# Patient Record
Sex: MALE | Race: WHITE
[De-identification: names, ages, dates, MRNs, and addresses within clinical notes are randomized per-mention and may not be internally consistent; named-entity substitution may affect disease eponyms.]

---

## 2019-10-28 ENCOUNTER — HOSPITAL ENCOUNTER (EMERGENCY)
Dept: HOSPITAL 33 - ED | Age: 54
Discharge: HOME | End: 2019-10-28
Payer: COMMERCIAL

## 2019-10-28 VITALS — DIASTOLIC BLOOD PRESSURE: 80 MMHG | SYSTOLIC BLOOD PRESSURE: 123 MMHG | HEART RATE: 68 BPM

## 2019-10-28 VITALS — OXYGEN SATURATION: 97 %

## 2019-10-28 DIAGNOSIS — R03.0: ICD-10-CM

## 2019-10-28 DIAGNOSIS — H52.13: ICD-10-CM

## 2019-10-28 DIAGNOSIS — H53.8: Primary | ICD-10-CM

## 2019-10-28 LAB
ALBUMIN SERPL-MCNC: 4.1 G/DL (ref 3.5–5)
ALP SERPL-CCNC: 63 U/L (ref 38–126)
ALT SERPL-CCNC: 25 U/L (ref 0–50)
AMPHETAMINES UR QL: NEGATIVE
ANION GAP SERPL CALC-SCNC: 13.8 MEQ/L (ref 5–15)
APTT PPP: 30.5 SECONDS (ref 24.1–36.1)
AST SERPL QL: 20 U/L (ref 17–59)
BARBITURATES UR QL: NEGATIVE
BASOPHILS # BLD AUTO: 0.01 10*3/UL (ref 0–0.4)
BASOPHILS NFR BLD AUTO: 0.1 % (ref 0–0.4)
BENZODIAZ UR QL SCN: NEGATIVE
BILIRUB BLD-MCNC: 0.4 MG/DL (ref 0.2–1.3)
BUN SERPL-MCNC: 25 MG/DL (ref 9–20)
CALCIUM SPEC-MCNC: 9.4 MG/DL (ref 8.4–10.2)
CHLORIDE SERPL-SCNC: 108 MMOL/L (ref 98–107)
CO2 SERPL-SCNC: 27 MMOL/L (ref 22–30)
COCAINE UR QL SCN: NEGATIVE
CREAT SERPL-MCNC: 1.09 MG/DL (ref 0.66–1.25)
EOSINOPHIL # BLD AUTO: 0.02 10*3/UL (ref 0–0.5)
ETHANOL SERPL-MCNC: < 10 MG/DL (ref 0–10)
GLUCOSE SERPL-MCNC: 97 MG/DL (ref 74–106)
GLUCOSE UR-MCNC: NEGATIVE MG/DL
HCT VFR BLD AUTO: 43.5 % (ref 42–50)
HGB BLD-MCNC: 14.6 GM/DL (ref 12.5–18)
INR PPP: 1.09 (ref 0.8–3)
LYMPHOCYTES # SPEC AUTO: 1.81 10*3/UL (ref 1–4.6)
MCH RBC QN AUTO: 31.6 PG (ref 26–32)
MCHC RBC AUTO-ENTMCNC: 33.6 G/DL (ref 32–36)
METHADONE UR QL: NEGATIVE
MONOCYTES # BLD AUTO: 0.46 10*3/UL (ref 0–1.3)
OPIATES UR QL: NEGATIVE
PCP UR QL CFM>20 NG/ML: NEGATIVE
PLATELET # BLD AUTO: 223 K/MM3 (ref 150–450)
POTASSIUM SERPLBLD-SCNC: 4.2 MMOL/L (ref 3.5–5.1)
PROT SERPL-MCNC: 6.9 G/DL (ref 6.3–8.2)
PROT UR STRIP-MCNC: NEGATIVE MG/DL
PROTHROMBIN TIME: 12.3 SECONDS (ref 8.83–12.87)
RBC # BLD AUTO: 4.62 M/MM3 (ref 4.1–5.6)
SODIUM SERPL-SCNC: 144 MMOL/L (ref 137–145)
THC UR QL SCN: NEGATIVE
WBC # BLD AUTO: 6.9 K/MM3 (ref 4–10.5)
WBC #/AREA URNS HPF: (no result) /HPF (ref 0–5)

## 2019-10-28 PROCEDURE — 85025 COMPLETE CBC W/AUTO DIFF WBC: CPT

## 2019-10-28 PROCEDURE — 85730 THROMBOPLASTIN TIME PARTIAL: CPT

## 2019-10-28 PROCEDURE — 99284 EMERGENCY DEPT VISIT MOD MDM: CPT

## 2019-10-28 PROCEDURE — 36415 COLL VENOUS BLD VENIPUNCTURE: CPT

## 2019-10-28 PROCEDURE — 80053 COMPREHEN METABOLIC PANEL: CPT

## 2019-10-28 PROCEDURE — 93005 ELECTROCARDIOGRAM TRACING: CPT

## 2019-10-28 PROCEDURE — 94760 N-INVAS EAR/PLS OXIMETRY 1: CPT

## 2019-10-28 PROCEDURE — 70450 CT HEAD/BRAIN W/O DYE: CPT

## 2019-10-28 PROCEDURE — 80307 DRUG TEST PRSMV CHEM ANLYZR: CPT

## 2019-10-28 PROCEDURE — 81001 URINALYSIS AUTO W/SCOPE: CPT

## 2019-10-28 PROCEDURE — G0480 DRUG TEST DEF 1-7 CLASSES: HCPCS

## 2019-10-28 PROCEDURE — 85610 PROTHROMBIN TIME: CPT

## 2019-10-28 PROCEDURE — 36000 PLACE NEEDLE IN VEIN: CPT

## 2019-10-28 PROCEDURE — 93041 RHYTHM ECG TRACING: CPT

## 2019-10-28 PROCEDURE — 84484 ASSAY OF TROPONIN QUANT: CPT

## 2019-10-28 NOTE — XRAY
Indication: Blurry vision.



Multiple contiguous axial images obtained through the head without contrast.



Comparison: None



Normal appearing brain parenchyma, ventricles, and bony calvarium.  Visualized

paranasal sinuses and mastoid air cells are clear.



Impression: Normal CT head without contrast exam.



CTDI 68.81

## 2019-10-28 NOTE — ERPHSYRPT
- History of Present Illness


Time Seen by Provider: 10/28/19 16:50


Source: patient


Exam Limitations: no limitations


Patient Subjective Stated Complaint: pt here for blurred vision to both eyes 

started at 1535 today, worse in rgiht eye, he states it is hazy, denies any 

injury


Triage Nursing Assessment: pt alert, walked in, resp easy, skin w/d/p. moves 

all ext well,  strong, pupils equal and reactive


Physician History: 





Patient began having blurry vision bilaterally prior to coming into the 

emergency department while driving into work.  Patient denies any loss of 

visual fields or any recent trauma to his eyes.


Timing/Duration: today, hour(s) (1)


Location: bilateral eyes


Severity: moderate


Apparent Injury: no


Associated Symptoms: blurred vision, No pain, No burning, No itching, No 

sensitivity to light, No redness, No matting, No eyelid swelling, No foreign 

body sensation, No decreased vision, No double vision


Visual Assistive Devices: None


Chemical Exposure: No


Trauma: No


[place]/Tanning Bed Exposure: No


Allergies/Adverse Reactions: 








No Known Drug Allergies Allergy (Unverified 10/28/19 16:50)


 





Home Medications: 








No Reportable Medications [No Reported Medications]  10/28/19 [History]





Hx Tetanus, Diphtheria Vaccination/Date Given: No


Hx Influenza Vaccination/Date Given: No


Hx Pneumococcal Vaccination/Date Given: No


Immunizations Up to Date: Yes





- Review of Systems


Constitutional: No Fever, No Chills, No Fatigue, No Malaise


Eyes: Vision Changes, No Discharge, No Eye Pain, No Eye Redness, No Itchy, No 

Photophobia, No Tearing, No Double Vision, No Foreign Body Sensation


Ears, Nose, & Throat: No Ear Pain, No Nose Congestion, No Throat Pain, No Hoarse

, No Painful Swallowing


Respiratory: No Cough, No Dyspnea


Cardiac: No Chest Pain, No Palpitations


Abdominal/Gastrointestinal: No Abdominal Pain, No Nausea, No Vomiting


Genitourinary Symptoms: No Hematuria, No Flank Pain


Musculoskeletal: No Back Pain, No Neck Pain, No Joint Pain


Skin: No Pruritis, No Rash


Neurological: No Dizziness, No Focal Weakness, No Gait Changes, No Headache, No 

Lethargy, No Parasthesia, No Sensory Changes, No Tremors, No Vertigo


Psychological: No Anxiety


Endocrine: No Polydipsia, No Excessive Sweating


Hematologic/Lymphatic: No Easy Bleeding, No Easy Bruising


All Other Systems: Reviewed and Negative





- Past Medical History


Pertinent Past Medical History: No





- Past Surgical History


Past Surgical History: Yes


Musculoskeletal: Orthopedic Surgery


Other Surgical History: kidney stone removal





- Social History


Smoking Status: Never smoker


Exposure to second hand smoke: No


Drug Use: none


Patient Lives Alone: No





- Nursing Vital Signs


Nursing Vital Signs: 


 Initial Vital Signs











Temperature  98.2 F   10/28/19 16:44


 


Pulse Rate  74   10/28/19 16:44


 


Respiratory Rate  16   10/28/19 16:44


 


Blood Pressure  146/84   10/28/19 16:44


 


O2 Sat by Pulse Oximetry  97   10/28/19 16:44








 Pain Scale











Pain Intensity                 0

















- Physical Exam


General Appearance: no apparent distress, alert


Vision Acuity Degree Evaluation Phase: Uncorrected


Vision Acuity Right Eye: no loss of visual acuity in any visual field


Vision Acuity Left Eye: no loss of visual acuity in any visual field


Eye Exam: bilateral eye: normal inspection, PERRL, EOMI (negative papilledema, 

negative hemorrhages, normal appearing vasculature with no abnormalities 

appreciated to fundoscopic examinations bilaterally)


Ears, Nose, Throat Exam: normal ENT inspection, TMs normal, pharynx normal, 

moist mucous membranes, No TM abnormal (R), No TM abnormal (L), No pharyngeal 

erythema


Neck Exam: normal inspection, non-tender, supple, full range of motion, No 

meningismus, No Brudzinski, No Kernig's, No lymphadenopathy, No midline 

tenderness


Respiratory Exam: normal breath sounds, lungs clear, airway intact, No chest 

tenderness, No respiratory distress, No diminished breath sounds, No accessory 

muscle use, No crackles/rales, No rhonchi, No wheezing, No stridor, No pleural 

rub


Cardiovascular Exam: regular rate/rhythm, normal heart sounds, normal 

peripheral pulses, capillary refill <2 sec


Gastrointestinal Exam: soft, normal bowel sounds, No tenderness, No distention, 

No mass, No guarding, No pulsatile mass, No rebound


Extremity Exam: normal inspection, normal range of motion, pelvis stable, No 

calf tenderness, No desi's sign, No swelling


Neurologic: alert, oriented x 3, cooperative, CNs II-XII nml as tested, normal 

mood/affect, nml cerebellar function, sensation nml, other (NIHSS:  0), No 

uncooperative, No motor weakness


Skin Exam: normal color, warm, dry, No rash


**SpO2 Interpretation**: normal


SpO2: 97


O2 Delivery: Room Air





- Course


Nursing assessment & vital signs reviewed: Yes


EKG Interpreted by Me: RATE (73), Sinus Rhythm, NORMAL AXIS, NORMAL INTERVALS, 

NORMAL QRS, NORMAL ST-T, Other (no previous EKG for comparison)





- CT Exams


  ** Head


CT Interpretation: Negative, No/Intracranial Hemorrhag, Other (per Radiologist 

for interpretation:  Normal CT head without contrast exam; normal appearing 

brain parenchyma, ventricles, and bony calvarium.  Visualized paranasal sinuses 

and mastoid air cells are clear)


Ordered Tests: 


 Active Orders 24 hr











 Category Date Time Status


 


 Cardiac Monitor STAT Care  10/28/19 17:01 Active


 


 EKG-ER Only STAT Care  10/28/19 17:01 Active


 


 IV Insertion STAT Care  10/28/19 17:01 Active


 


 NPO (ED) STAT Care  10/28/19 17:01 Active


 


 Pulse Oximetry (ED) STAT Care  10/28/19 17:01 Active


 


 Visual Acuity STAT Care  10/28/19 16:51 Active


 


 HEAD WITHOUT CONTRAST [CT] Stat Exams  10/28/19 17:01 Completed


 


 CBC W DIFF Stat Lab  10/28/19 17:40 Completed


 


 CMP Stat Lab  10/28/19 17:40 Completed


 


 ETHYL ALCOHOL Stat Lab  10/28/19 17:40 Completed


 


 PROTIME WITH INR Stat Lab  10/28/19 17:40 Completed


 


 PTT Stat Lab  10/28/19 17:40 Completed


 


 TROPONIN Q3H Lab  10/28/19 17:40 Completed


 


 TROPONIN Q3H Lab  10/28/19 20:15 Ordered


 


 TROPONIN Q3H Lab  10/28/19 23:15 Ordered


 


 TROPONIN Q3H Lab  10/29/19 02:15 Ordered


 


 TROPONIN Q3H Lab  10/29/19 05:15 Ordered


 


 UA W/RFX UR CULTURE Stat Lab  10/28/19 19:00 Completed


 


 Urine Triage Profile Stat Lab  10/28/19 Completed











Lab/Rad Data: 


 Laboratory Result Diagrams





 10/28/19 17:40 





 10/28/19 17:40 





 Laboratory Results











  10/28/19 10/28/19 10/28/19 Range/Units





  Unknown 19:00 17:40 


 


WBC     (4.0-10.5)  K/mm3


 


RBC     (4.1-5.6)  M/mm3


 


Hgb     (12.5-18.0)  gm/dl


 


Hct     (42-50)  %


 


MCV     ()  fl


 


MCH     (26-32)  pg


 


MCHC     (32-36)  g/dl


 


RDW     (11.5-14.0)  %


 


Plt Count     (150-450)  K/mm3


 


MPV     (6-9.5)  fl


 


Gran %     (36.0-66.0)  %


 


Eos # (Auto)     (0-0.5)  


 


Absolute Lymphs (auto)     (1.0-4.6)  


 


Absolute Monos (auto)     (0.0-1.3)  


 


Lymphocytes %     (24.0-44.0)  %


 


Monocytes %     (0.0-12.0)  %


 


Eosinophils %     (0.00-5.0)  %


 


Basophils %     (0.0-0.4)  %


 


Absolute Granulocytes     (1.4-6.9)  


 


Basophils #     (0-0.4)  


 


PT     (8.83-12.87)  SECONDS


 


INR     (0.8-3.0)  


 


APTT     (24.1-36.1)  SECONDS


 


Sodium     (137-145)  mmol/L


 


Potassium     (3.5-5.1)  mmol/L


 


Chloride     ()  mmol/L


 


Carbon Dioxide     (22-30)  mmol/L


 


Anion Gap     (5-15)  MEQ/L


 


BUN     (9-20)  mg/dL


 


Creatinine     (0.66-1.25)  mg/dL


 


Estimated GFR     ML/MIN


 


Glucose     ()  mg/dL


 


Calcium     (8.4-10.2)  mg/dL


 


Total Bilirubin     (0.2-1.3)  mg/dL


 


AST     (17-59)  U/L


 


ALT     (0-50)  U/L


 


Alkaline Phosphatase     ()  U/L


 


Troponin I    < 0.012  (0.000-0.034)  ng/mL


 


Serum Total Protein     (6.3-8.2)  g/dL


 


Albumin     (3.5-5.0)  g/dL


 


Urine Color   YELLOW   (YELLOW)  


 


Urine Appearance   CLEAR   (CLEAR)  


 


Urine pH   6.0   (5-6)  


 


Ur Specific Gravity   1.020   (1.005-1.025)  


 


Urine Protein   NEGATIVE   (Negative)  


 


Urine Ketones   NEGATIVE   (NEGATIVE)  


 


Urine Blood   NEGATIVE   (0-5)  Fredo/ul


 


Urine Nitrite   NEGATIVE   (NEGATIVE)  


 


Urine Bilirubin   NEGATIVE   (NEGATIVE)  


 


Urine Urobilinogen   NEGATIVE   (0-1)  mg/dL


 


Ur Leukocyte Esterase   NEGATIVE   (NEGATIVE)  


 


Urine WBC (Auto)   NONE   (0-5)  /HPF


 


Urine Mucus (Auto)   SLIGHT   (NEGATIVE)  /HPF


 


Urine Culture Reflexed   NO   (NO)  


 


Urine Glucose   NEGATIVE   (NEGATIVE)  mg/dL


 


Urine Opiates Level  NEGATIVE    (NEGATIVE)  


 


Ur Methadone  NEGATIVE    (NEGATIVE)  


 


Urine Barbiturates  NEGATIVE    (NEGATIVE)  


 


Ur Phencyclidine (PCP)  NEGATIVE    (NEGATIVE)  


 


Urine Amphetamine  NEGATIVE    (NEGATIVE)  


 


U Benzodiazepine Level  NEGATIVE    (NEGATIVE)  


 


Urine Cocaine  NEGATIVE    (NEGATIVE)  


 


Urine Marijuana (THC)  NEGATIVE    (NEGATIVE)  


 


Ethyl Alcohol     (0-10)  mg/dL














  10/28/19 10/28/19 10/28/19 Range/Units





  17:40 17:40 17:40 


 


WBC    6.9  (4.0-10.5)  K/mm3


 


RBC    4.62  (4.1-5.6)  M/mm3


 


Hgb    14.6  (12.5-18.0)  gm/dl


 


Hct    43.5  (42-50)  %


 


MCV    94.2  ()  fl


 


MCH    31.6  (26-32)  pg


 


MCHC    33.6  (32-36)  g/dl


 


RDW    13.0  (11.5-14.0)  %


 


Plt Count    223  (150-450)  K/mm3


 


MPV    9.9 H  (6-9.5)  fl


 


Gran %    66.6 H  (36.0-66.0)  %


 


Eos # (Auto)    0.02  (0-0.5)  


 


Absolute Lymphs (auto)    1.81  (1.0-4.6)  


 


Absolute Monos (auto)    0.46  (0.0-1.3)  


 


Lymphocytes %    26.3  (24.0-44.0)  %


 


Monocytes %    6.7  (0.0-12.0)  %


 


Eosinophils %    0.3  (0.00-5.0)  %


 


Basophils %    0.1  (0.0-0.4)  %


 


Absolute Granulocytes    4.58  (1.4-6.9)  


 


Basophils #    0.01  (0-0.4)  


 


PT   12.3   (8.83-12.87)  SECONDS


 


INR   1.09   (0.8-3.0)  


 


APTT   30.5   (24.1-36.1)  SECONDS


 


Sodium  144    (137-145)  mmol/L


 


Potassium  4.2    (3.5-5.1)  mmol/L


 


Chloride  108 H    ()  mmol/L


 


Carbon Dioxide  27    (22-30)  mmol/L


 


Anion Gap  13.8    (5-15)  MEQ/L


 


BUN  25 H    (9-20)  mg/dL


 


Creatinine  1.09    (0.66-1.25)  mg/dL


 


Estimated GFR  > 60.0    ML/MIN


 


Glucose  97    ()  mg/dL


 


Calcium  9.4    (8.4-10.2)  mg/dL


 


Total Bilirubin  0.40    (0.2-1.3)  mg/dL


 


AST  20    (17-59)  U/L


 


ALT  25    (0-50)  U/L


 


Alkaline Phosphatase  63    ()  U/L


 


Troponin I     (0.000-0.034)  ng/mL


 


Serum Total Protein  6.9    (6.3-8.2)  g/dL


 


Albumin  4.1    (3.5-5.0)  g/dL


 


Urine Color     (YELLOW)  


 


Urine Appearance     (CLEAR)  


 


Urine pH     (5-6)  


 


Ur Specific Gravity     (1.005-1.025)  


 


Urine Protein     (Negative)  


 


Urine Ketones     (NEGATIVE)  


 


Urine Blood     (0-5)  Fredo/ul


 


Urine Nitrite     (NEGATIVE)  


 


Urine Bilirubin     (NEGATIVE)  


 


Urine Urobilinogen     (0-1)  mg/dL


 


Ur Leukocyte Esterase     (NEGATIVE)  


 


Urine WBC (Auto)     (0-5)  /HPF


 


Urine Mucus (Auto)     (NEGATIVE)  /HPF


 


Urine Culture Reflexed     (NO)  


 


Urine Glucose     (NEGATIVE)  mg/dL


 


Urine Opiates Level     (NEGATIVE)  


 


Ur Methadone     (NEGATIVE)  


 


Urine Barbiturates     (NEGATIVE)  


 


Ur Phencyclidine (PCP)     (NEGATIVE)  


 


Urine Amphetamine     (NEGATIVE)  


 


U Benzodiazepine Level     (NEGATIVE)  


 


Urine Cocaine     (NEGATIVE)  


 


Urine Marijuana (THC)     (NEGATIVE)  


 


Ethyl Alcohol  < 10    (0-10)  mg/dL














- Progress


Progress: unchanged


Progress Note: 





10/28/19 19:57


Patient has no focal neurologic deficits on repeat examination and no visual 

complaints.  Patient has remained in sinus rhythm on the cardiac monitor 

throughout his time in the emergency department.


Counseled pt/family regarding: lab results, diagnosis, need for follow-up, rad 

results





- Departure


Departure Disposition: Home


Clinical Impression: 


 Blurry vision, bilateral, Myopia of both eyes, Elevated blood pressure reading 

without diagnosis of hypertension





Condition: Good


Critical Care Time: No


Referrals: 


DOCTOR,NO FAMILY [Primary Care Provider] - 


JOYCELYN QUEEN [ACTIVE STAFF] - Follow Up with PCP/3 days


NITA KELLY OD [NON-STAFF PHY W/O PRIVILEGES] - 10/29/19 (Eye Specialist for 

your reference)


Instructions:  Nearsightedness, DASH Diet


Additional Instructions: 


Follow-up with the eye physician referral n 10/29/2019 and the primary care 

physician referral.  Your labwork, EKG, Urine tests, CT scan of your head did 

not show any significant abnormalities.  Return immediately back to the 

emergency department if any double vision, loss of vision, weakness, loss of 

sensation, facial droop, speech change or any other concerning sign or symptom 

that was not present at today's emergency department visit for immediate re-

evaluation in the emergency department. 


Forms:  Work/School Release Form